# Patient Record
Sex: MALE | Race: WHITE | Employment: FULL TIME | URBAN - METROPOLITAN AREA
[De-identification: names, ages, dates, MRNs, and addresses within clinical notes are randomized per-mention and may not be internally consistent; named-entity substitution may affect disease eponyms.]

---

## 2020-07-18 ENCOUNTER — APPOINTMENT (OUTPATIENT)
Dept: GENERAL RADIOLOGY | Age: 30
End: 2020-07-18
Payer: COMMERCIAL

## 2020-07-18 ENCOUNTER — APPOINTMENT (OUTPATIENT)
Dept: CT IMAGING | Age: 30
End: 2020-07-18
Payer: COMMERCIAL

## 2020-07-18 ENCOUNTER — HOSPITAL ENCOUNTER (EMERGENCY)
Age: 30
Discharge: HOME | End: 2020-07-18
Attending: EMERGENCY MEDICINE
Payer: COMMERCIAL

## 2020-07-18 VITALS
BODY MASS INDEX: 26.52 KG/M2 | TEMPERATURE: 99.3 F | OXYGEN SATURATION: 100 % | HEART RATE: 98 BPM | WEIGHT: 175 LBS | DIASTOLIC BLOOD PRESSURE: 77 MMHG | HEIGHT: 68 IN | RESPIRATION RATE: 18 BRPM | SYSTOLIC BLOOD PRESSURE: 129 MMHG

## 2020-07-18 LAB
ALBUMIN SERPL-MCNC: 4.5 G/DL (ref 3.5–5.2)
ALP BLD-CCNC: 71 U/L (ref 40–129)
ALT SERPL-CCNC: 36 U/L (ref 0–40)
ANION GAP SERPL CALCULATED.3IONS-SCNC: 14 MMOL/L (ref 7–16)
APTT: 28.5 SEC (ref 24.5–35.1)
AST SERPL-CCNC: 36 U/L (ref 0–39)
BASOPHILS ABSOLUTE: 0.04 E9/L (ref 0–0.2)
BASOPHILS RELATIVE PERCENT: 0.2 % (ref 0–2)
BILIRUB SERPL-MCNC: 0.4 MG/DL (ref 0–1.2)
BUN BLDV-MCNC: 11 MG/DL (ref 6–20)
CALCIUM SERPL-MCNC: 9.3 MG/DL (ref 8.6–10.2)
CHLORIDE BLD-SCNC: 104 MMOL/L (ref 98–107)
CO2: 22 MMOL/L (ref 22–29)
CREAT SERPL-MCNC: 1.2 MG/DL (ref 0.7–1.2)
EOSINOPHILS ABSOLUTE: 0 E9/L (ref 0.05–0.5)
EOSINOPHILS RELATIVE PERCENT: 0 % (ref 0–6)
GFR AFRICAN AMERICAN: >60
GFR NON-AFRICAN AMERICAN: >60 ML/MIN/1.73
GLUCOSE BLD-MCNC: 148 MG/DL (ref 74–99)
HCT VFR BLD CALC: 49 % (ref 37–54)
HEMOGLOBIN: 16.7 G/DL (ref 12.5–16.5)
IMMATURE GRANULOCYTES #: 0.1 E9/L
IMMATURE GRANULOCYTES %: 0.6 % (ref 0–5)
INR BLD: 1
LYMPHOCYTES ABSOLUTE: 1.23 E9/L (ref 1.5–4)
LYMPHOCYTES RELATIVE PERCENT: 6.9 % (ref 20–42)
MCH RBC QN AUTO: 29.1 PG (ref 26–35)
MCHC RBC AUTO-ENTMCNC: 34.1 % (ref 32–34.5)
MCV RBC AUTO: 85.5 FL (ref 80–99.9)
MONOCYTES ABSOLUTE: 0.71 E9/L (ref 0.1–0.95)
MONOCYTES RELATIVE PERCENT: 4 % (ref 2–12)
NEUTROPHILS ABSOLUTE: 15.72 E9/L (ref 1.8–7.3)
NEUTROPHILS RELATIVE PERCENT: 88.3 % (ref 43–80)
PDW BLD-RTO: 12.6 FL (ref 11.5–15)
PLATELET # BLD: 315 E9/L (ref 130–450)
PMV BLD AUTO: 9.7 FL (ref 7–12)
POTASSIUM SERPL-SCNC: 4 MMOL/L (ref 3.5–5)
PROTHROMBIN TIME: 11.9 SEC (ref 9.3–12.4)
RBC # BLD: 5.73 E12/L (ref 3.8–5.8)
SODIUM BLD-SCNC: 140 MMOL/L (ref 132–146)
TOTAL PROTEIN: 7.8 G/DL (ref 6.4–8.3)
TROPONIN: <0.01 NG/ML (ref 0–0.03)
WBC # BLD: 17.8 E9/L (ref 4.5–11.5)

## 2020-07-18 PROCEDURE — 73090 X-RAY EXAM OF FOREARM: CPT

## 2020-07-18 PROCEDURE — 85730 THROMBOPLASTIN TIME PARTIAL: CPT

## 2020-07-18 PROCEDURE — 6830039000 HC L3 TRAUMA ALERT

## 2020-07-18 PROCEDURE — 6360000002 HC RX W HCPCS: Performed by: EMERGENCY MEDICINE

## 2020-07-18 PROCEDURE — 72125 CT NECK SPINE W/O DYE: CPT

## 2020-07-18 PROCEDURE — 73130 X-RAY EXAM OF HAND: CPT

## 2020-07-18 PROCEDURE — 80307 DRUG TEST PRSMV CHEM ANLYZR: CPT

## 2020-07-18 PROCEDURE — 90471 IMMUNIZATION ADMIN: CPT | Performed by: EMERGENCY MEDICINE

## 2020-07-18 PROCEDURE — 80053 COMPREHEN METABOLIC PANEL: CPT

## 2020-07-18 PROCEDURE — 72128 CT CHEST SPINE W/O DYE: CPT

## 2020-07-18 PROCEDURE — 2580000003 HC RX 258: Performed by: EMERGENCY MEDICINE

## 2020-07-18 PROCEDURE — 2580000003 HC RX 258

## 2020-07-18 PROCEDURE — 71045 X-RAY EXAM CHEST 1 VIEW: CPT

## 2020-07-18 PROCEDURE — 36415 COLL VENOUS BLD VENIPUNCTURE: CPT

## 2020-07-18 PROCEDURE — 85610 PROTHROMBIN TIME: CPT

## 2020-07-18 PROCEDURE — 85025 COMPLETE CBC W/AUTO DIFF WBC: CPT

## 2020-07-18 PROCEDURE — G0480 DRUG TEST DEF 1-7 CLASSES: HCPCS

## 2020-07-18 PROCEDURE — 99284 EMERGENCY DEPT VISIT MOD MDM: CPT

## 2020-07-18 PROCEDURE — 70450 CT HEAD/BRAIN W/O DYE: CPT

## 2020-07-18 PROCEDURE — 90715 TDAP VACCINE 7 YRS/> IM: CPT | Performed by: EMERGENCY MEDICINE

## 2020-07-18 PROCEDURE — 84484 ASSAY OF TROPONIN QUANT: CPT

## 2020-07-18 RX ORDER — 0.9 % SODIUM CHLORIDE 0.9 %
1000 INTRAVENOUS SOLUTION INTRAVENOUS ONCE
Status: COMPLETED | OUTPATIENT
Start: 2020-07-18 | End: 2020-07-18

## 2020-07-18 RX ORDER — SODIUM CHLORIDE 0.9 % (FLUSH) 0.9 %
SYRINGE (ML) INJECTION
Status: COMPLETED
Start: 2020-07-18 | End: 2020-07-18

## 2020-07-18 RX ADMIN — SODIUM CHLORIDE 1000 ML: 9 INJECTION, SOLUTION INTRAVENOUS at 19:09

## 2020-07-18 RX ADMIN — SODIUM CHLORIDE, PRESERVATIVE FREE 10 ML: 5 INJECTION INTRAVENOUS at 19:31

## 2020-07-18 RX ADMIN — TETANUS TOXOID, REDUCED DIPHTHERIA TOXOID AND ACELLULAR PERTUSSIS VACCINE, ADSORBED 0.5 ML: 5; 2.5; 8; 8; 2.5 SUSPENSION INTRAMUSCULAR at 19:29

## 2020-07-18 ASSESSMENT — PAIN DESCRIPTION - PAIN TYPE
TYPE: ACUTE PAIN

## 2020-07-18 ASSESSMENT — PAIN DESCRIPTION - LOCATION
LOCATION: FACE
LOCATION: HAND

## 2020-07-18 ASSESSMENT — ENCOUNTER SYMPTOMS
SHORTNESS OF BREATH: 0
ABDOMINAL PAIN: 0
BACK PAIN: 0
COUGH: 0

## 2020-07-18 ASSESSMENT — PAIN DESCRIPTION - PROGRESSION: CLINICAL_PROGRESSION: GRADUALLY IMPROVING

## 2020-07-18 ASSESSMENT — PAIN DESCRIPTION - DESCRIPTORS: DESCRIPTORS: SORE

## 2020-07-18 ASSESSMENT — PAIN SCALES - GENERAL
PAINLEVEL_OUTOF10: 7
PAINLEVEL_OUTOF10: 8
PAINLEVEL_OUTOF10: 7
PAINLEVEL_OUTOF10: 7

## 2020-07-18 ASSESSMENT — PAIN DESCRIPTION - FREQUENCY: FREQUENCY: CONTINUOUS

## 2020-07-18 ASSESSMENT — PAIN - FUNCTIONAL ASSESSMENT
PAIN_FUNCTIONAL_ASSESSMENT: 0-10
PAIN_FUNCTIONAL_ASSESSMENT: PREVENTS OR INTERFERES WITH MANY ACTIVE NOT PASSIVE ACTIVITIES

## 2020-07-18 ASSESSMENT — PAIN DESCRIPTION - ORIENTATION: ORIENTATION: RIGHT

## 2020-07-18 ASSESSMENT — PAIN DESCRIPTION - ONSET: ONSET: ON-GOING

## 2020-07-18 NOTE — ED NOTES
[address] [facility] ED  1806 TRAUMA ALERT CALLED VIA   1808 DR Gloria Ashton  07/18/20 1813
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
## 2020-07-19 LAB
ACETAMINOPHEN LEVEL: <5 MCG/ML (ref 10–30)
ETHANOL: <10 MG/DL (ref 0–0.08)
SALICYLATE, SERUM: <0.3 MG/DL (ref 0–30)
TRICYCLIC ANTIDEPRESSANTS SCREEN SERUM: NEGATIVE NG/ML